# Patient Record
Sex: MALE | Race: WHITE
[De-identification: names, ages, dates, MRNs, and addresses within clinical notes are randomized per-mention and may not be internally consistent; named-entity substitution may affect disease eponyms.]

---

## 2017-02-26 ENCOUNTER — HOSPITAL ENCOUNTER (EMERGENCY)
Dept: HOSPITAL 47 - EC | Age: 27
Discharge: HOME | End: 2017-02-26
Payer: COMMERCIAL

## 2017-02-26 VITALS
SYSTOLIC BLOOD PRESSURE: 142 MMHG | DIASTOLIC BLOOD PRESSURE: 88 MMHG | HEART RATE: 115 BPM | TEMPERATURE: 98.2 F | RESPIRATION RATE: 18 BRPM

## 2017-02-26 DIAGNOSIS — S93.602A: Primary | ICD-10-CM

## 2017-02-26 DIAGNOSIS — Z88.1: ICD-10-CM
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
## 2017-02-26 DIAGNOSIS — F17.200: ICD-10-CM

## 2017-02-26 PROCEDURE — 99284 EMERGENCY DEPT VISIT MOD MDM: CPT

## 2017-02-26 NOTE — ED
Lower Extremity Injury HPI





- General


Chief Complaint: Extremity Injury, Lower


Stated Complaint: Foot Pain


Time Seen by Provider: 02/26/17 15:29


Source: patient


Mode of arrival: wheelchair


Limitations: no limitations





- History of Present Illness


Initial Comments: 


Patient is a 26-year-old white male presenting to the emergency department with 

complaints of dorsal left foot pain.  Patient states that injury at 7 AM this 

morning.  Patient states that he tripped over a door step.  Patient describes 

pain as sharp, currently rated 0 out of 10 at rest, 6 out of 10 with activity, 

patient was able to ambulate after the injury and in the emergency department.  

Patient denies numbness or tingling.  Patient denies previous injury or surgery 

to left lower extremity.  Patient denies treatment prior to arrival.


MD Complaint: other (Left foot injury)


Place: home


Severity: moderate


Severity scale (1-10): 6


Improves With: rest


Worsens With: weight bearing, movement, palpation


Context: fall


Associated Symptoms: swelling, ambulatory





- Related Data


 Allergies











Allergy/AdvReac Type Severity Reaction Status Date / Time


 


azithromycin [From Zithromax] Allergy  Rash/Hives Verified 02/26/17 14:50














Review of Systems


ROS Statement: 


Those systems with pertinent positive or pertinent negative responses have been 

documented in the HPI.





ROS Other: All systems not noted in ROS Statement are negative.





Past Medical History


Past Medical History: No Reported History, GERD/Reflux


History of Any Multi-Drug Resistant Organisms: None Reported


Past Surgical History: Orthopedic Surgery, Tonsillectomy


Additional Past Surgical History / Comment(s): tubes in ear, right hand


Past Psychological History: No Psychological Hx Reported


Smoking Status: Current every day smoker


Past Alcohol Use History: Occasional


Past Drug Use History: None Reported





General Exam


Limitations: no limitations


General appearance: alert, in no apparent distress


Head exam: Present: atraumatic, normocephalic, normal inspection


Eye exam: Present: normal appearance


Respiratory exam: Present: normal lung sounds bilaterally.  Absent: wheezes, 

rales, rhonchi


Cardiovascular Exam: Present: normal rhythm, tachycardia, normal heart sounds


GI/Abdominal exam: Present: soft, normal bowel sounds.  Absent: tenderness


  ** Left


Lower Leg exam: Present: normal inspection, full ROM.  Absent: tenderness, 

swelling


Ankle exam: Present: normal inspection, full ROM.  Absent: tenderness, swelling


Foot/Toe exam: Present: full ROM, tenderness (Tenderness to the dorsal aspect 

of left midfoot), swelling (Mild swelling to left foot).  Absent: ecchymosis, 

deformity, calcaneal tenderness, tenderness at base of 5th metatarsal


Neurovascular tendon exam: Present: no vascular compromise.  Absent: pulse 

deficit, motor deficit, sensory deficit, tendon deficit, extremity cold to touch

, decreased fine/light touch, foot drop, significant pain with passive ROM of 

distal joint


Gait: observed and limited by pain


Neurological exam: Present: alert, oriented X3, other (No focal deficits)


Psychiatric exam: Present: normal affect, normal mood


Skin exam: Present: warm, dry, intact, normal color





Course


 Vital Signs











  02/26/17





  14:50


 


Temperature 98.2 F


 


Pulse Rate 115 H


 


Respiratory 18





Rate 


 


Blood Pressure 142/88


 


O2 Sat by Pulse 96





Oximetry 














Medical Decision Making





- Medical Decision Making


Left foot sprain.  Left foot x-ray negative for fracture.  Ace wrap applied to 

left foot.  Patient instructed to follow-up with orthopedic pediatric surgeon 

as needed.  Discharge instructions and return parameters reviewed.








- Radiology Data


Radiology results: report reviewed


X-ray left foot: No acute fracture or dislocation evident joint spaces appear 

within normal limits.  Overlying soft tissue appears unremarkable





Disposition


Clinical Impression: 


 Sprain of left foot





Disposition: HOME SELF-CARE


Condition: Good


Instructions:  Foot Sprain (ED)


Additional Instructions: 


Avoid activities that cause pain.  Apply ice 20 minutes 4 times a day usually 

for 2-3 days.  Use Ace bandage to support and limit swelling.  Keep foot 

elevated as much as possible for 24-48 hours.  Continue Motrin 800 every 8 

hours for 48 hours.  Follow-up with orthopedic surgeon as directed.  Please 

return to the emergency department if symptoms do not improve or get worse.


Referrals: 


Wesley Boland DO [Primary Care Provider] - 1-2 days


Eder Whyte MD [REFERRING] - 1-2 days


Time of Disposition: 16:39

## 2017-02-26 NOTE — XR
EXAMINATION TYPE: XR foot complete LT

 

DATE OF EXAM: 2/26/2017 3:52 PM

 

CLINICAL HISTORY: pain

 

TECHNIQUE: Frontal, lateral and oblique images of the left foot are obtained.

 

COMPARISON: None.

 

FINDINGS:  There is no acute fracture/dislocation evident. The joint spaces  appear within normal eid
its.  The overlying soft tissue appears unremarkable.

 

IMPRESSION:  

There is no acute fracture or dislocation.

 

ICD 10 NO FRACTURE, INITIAL EVALUATION

## 2021-08-10 ENCOUNTER — HOSPITAL ENCOUNTER (OUTPATIENT)
Dept: HOSPITAL 47 - ORWHC2ENDO | Age: 31
Discharge: HOME | End: 2021-08-10
Attending: SURGERY
Payer: COMMERCIAL

## 2021-08-10 VITALS — TEMPERATURE: 97.3 F | RESPIRATION RATE: 16 BRPM

## 2021-08-10 VITALS — SYSTOLIC BLOOD PRESSURE: 129 MMHG | DIASTOLIC BLOOD PRESSURE: 85 MMHG | HEART RATE: 66 BPM

## 2021-08-10 VITALS — BODY MASS INDEX: 31.7 KG/M2

## 2021-08-10 DIAGNOSIS — F17.290: ICD-10-CM

## 2021-08-10 DIAGNOSIS — Z98.890: ICD-10-CM

## 2021-08-10 DIAGNOSIS — Z88.1: ICD-10-CM

## 2021-08-10 DIAGNOSIS — K29.50: Primary | ICD-10-CM

## 2021-08-10 DIAGNOSIS — K44.9: ICD-10-CM

## 2021-08-10 DIAGNOSIS — Z79.899: ICD-10-CM

## 2021-08-10 DIAGNOSIS — K21.9: ICD-10-CM

## 2021-08-10 DIAGNOSIS — Z90.89: ICD-10-CM

## 2021-08-10 PROCEDURE — 43239 EGD BIOPSY SINGLE/MULTIPLE: CPT

## 2021-08-10 PROCEDURE — 88305 TISSUE EXAM BY PATHOLOGIST: CPT

## 2021-08-10 NOTE — P.PCN
Date of Procedure: 08/10/21


Procedure(s) Performed: 


Preoperative Dx: GERD


Postoperative Dx: Mild gastritis


Procedure: EGD with Bx


Anesthesia: Sedation


Endoscopist: Dr. Sheridan


Specimens: Antrum


Endoscopic Procedure:   The patient was on the endoscopy table in the left 

decubitus position.  The Olympus gastroscope was inserted into the oropharynx 

and passed under direct visualization to the region of the third portion of the 

duodenum.  From that point the scope was slowly withdrawn inspecting all 

surfaces carefully.  There were no neoplastic inflammatory or polypoid lesions 

throughout the duodenum.  The pylorus was widely patent.  The stomach was 

carefully inspected.  There was mild gastritis present.  A biopsy of the antrum 

took place to rule out H. pylori.  Retroflexion revealed a normal hiatus.  The 

esophagus was then carefully examined.  There were no neoplastic inflammatory or

polypoid lesions throughout the visualized esophagus.  The patient was then 

taken to the recovery room in stable condition per anesthesia guidelines.


Recommendations: Resume diet.  Continue intermittent or as needed antiacid 

therapy.

## 2021-08-10 NOTE — P.GSHP
History of Present Illness


H&P Date: 08/10/21


Chief Complaint: GERD





Patient here today for upper endoscopy.  Patient with history of reflux.  Takes 

Protonix most days.  Patient has good control of his symptoms.  No dysphagia 

recently.  History of previous mild gastritis and small hiatal hernia.





Past Medical History


Past Medical History: No Reported History, GERD/Reflux


History of Any Multi-Drug Resistant Organisms: None Reported


Past Surgical History: Orthopedic Surgery, Tonsillectomy


Additional Past Surgical History / Comment(s): tubes in ear, right hand


Past Anesthesia/Blood Transfusion Reactions: No Reported Reaction


Smoking Status: Never smoker





- Past Family History


  ** Mother


Family Medical History: No Reported History





Medications and Allergies


                                Home Medications











 Medication  Instructions  Recorded  Confirmed  Type


 


No Known Home Medications  08/10/21 08/10/21 History








                                    Allergies











Allergy/AdvReac Type Severity Reaction Status Date / Time


 


azithromycin [From Zithromax] Allergy  Rash/Hives Verified 08/10/21 10:44














Surgical - Exam


                                   Vital Signs











Temp Pulse Resp BP Pulse Ox


 


 97.3 F L  73   16   142/91   98 


 


 08/10/21 10:45  08/10/21 10:45  08/10/21 10:45  08/10/21 10:45  08/10/21 10:45

















Physical exam:


General: Well-developed, well-nourished


HEENT: Normocephalic, sclerae nonicteric


Abdomen: Nontender, nondistended


Extremities: No edema


Neuro: Alert and oriented





Assessment and Plan


(1) GERD (gastroesophageal reflux disease)


Narrative/Plan: 


Will proceed with upper endoscopy


Current Visit: Yes   Status: Acute   Code(s): K21.9 - GASTRO-ESOPHAGEAL REFLUX 

DISEASE WITHOUT ESOPHAGITIS   SNOMED Code(s): 957480125